# Patient Record
Sex: FEMALE | Race: WHITE | Employment: FULL TIME | URBAN - METROPOLITAN AREA
[De-identification: names, ages, dates, MRNs, and addresses within clinical notes are randomized per-mention and may not be internally consistent; named-entity substitution may affect disease eponyms.]

---

## 2021-11-13 ENCOUNTER — HOSPITAL ENCOUNTER (OUTPATIENT)
Age: 26
Discharge: HOME | End: 2021-11-13
Attending: PHYSICIAN ASSISTANT
Payer: COMMERCIAL

## 2021-11-13 VITALS
HEART RATE: 98 BPM | OXYGEN SATURATION: 98 % | TEMPERATURE: 98 F | RESPIRATION RATE: 16 BRPM | SYSTOLIC BLOOD PRESSURE: 134 MMHG | DIASTOLIC BLOOD PRESSURE: 77 MMHG

## 2021-11-13 DIAGNOSIS — Z20.822 ENCOUNTER FOR LABORATORY TESTING FOR COVID-19 VIRUS: ICD-10-CM

## 2021-11-13 DIAGNOSIS — J02.9 ACUTE VIRAL PHARYNGITIS: Primary | ICD-10-CM

## 2021-11-13 PROCEDURE — 99204 OFFICE O/P NEW MOD 45 MIN: CPT

## 2021-11-13 PROCEDURE — 99203 OFFICE O/P NEW LOW 30 MIN: CPT

## 2021-11-13 PROCEDURE — 87880 STREP A ASSAY W/OPTIC: CPT

## 2021-11-13 RX ORDER — IBUPROFEN 600 MG/1
TABLET ORAL
Qty: 20 TABLET | Refills: 0 | Status: SHIPPED | OUTPATIENT
Start: 2021-11-13

## 2021-11-13 NOTE — ED PROVIDER NOTES
Patient Seen in: [facility]    History   Patient presents with:  Sore Throat    Stated Complaint: sore throat    HPI    72-year-old female presents with chief complaint of sore throat. Onset yesterday. Patient denies sick contacts.   Pain sc well-nourished. Mild discomfort. Psychological: Alert, No abnormalities of mood, affect. Head: Normocephalic/atraumatic. Nontender. Eyes: Pupils are equal round reactive to light. Conjunctiva are within normal limits. ENT: Pharynx injected.   Tonsils understanding of the discharge instructions and plan.     Disposition and Plan     Clinical Impression:  Acute viral pharyngitis  (primary encounter diagnosis)  Encounter for laboratory testing for COVID-19 virus    Disposition:  Discharge    Follow-up:  Ar